# Patient Record
Sex: MALE | Race: OTHER | NOT HISPANIC OR LATINO | URBAN - METROPOLITAN AREA
[De-identification: names, ages, dates, MRNs, and addresses within clinical notes are randomized per-mention and may not be internally consistent; named-entity substitution may affect disease eponyms.]

---

## 2018-04-24 RX ORDER — BETAMETHASONE VALERATE 1 MG/G
0.1 CREAM TOPICAL
COMMUNITY
Start: 2017-02-20 | End: 2019-04-04 | Stop reason: ALTCHOICE

## 2018-04-24 RX ORDER — GABAPENTIN 100 MG/1
1 CAPSULE ORAL DAILY
COMMUNITY
End: 2019-04-04 | Stop reason: ALTCHOICE

## 2018-04-25 ENCOUNTER — OFFICE VISIT (OUTPATIENT)
Dept: INTERNAL MEDICINE | Facility: CLINIC | Age: 63
End: 2018-04-25

## 2018-04-25 VITALS
RESPIRATION RATE: 16 BRPM | HEIGHT: 65 IN | WEIGHT: 177 LBS | OXYGEN SATURATION: 97 % | HEART RATE: 66 BPM | DIASTOLIC BLOOD PRESSURE: 70 MMHG | BODY MASS INDEX: 29.49 KG/M2 | SYSTOLIC BLOOD PRESSURE: 118 MMHG

## 2018-04-25 DIAGNOSIS — Z12.9 CANCER SCREENING: Primary | ICD-10-CM

## 2018-04-25 DIAGNOSIS — Z00.00 PHYSICAL EXAM: ICD-10-CM

## 2018-04-25 PROCEDURE — 99213 OFFICE O/P EST LOW 20 MIN: CPT | Performed by: INTERNAL MEDICINE

## 2018-04-29 PROBLEM — Z00.00 PHYSICAL EXAM: Status: ACTIVE | Noted: 2018-04-29

## 2018-04-29 ASSESSMENT — ENCOUNTER SYMPTOMS
GASTROINTESTINAL NEGATIVE: 1
NEUROLOGICAL NEGATIVE: 1
ALLERGIC/IMMUNOLOGIC NEGATIVE: 1
PSYCHIATRIC NEGATIVE: 1
RESPIRATORY NEGATIVE: 1
MUSCULOSKELETAL NEGATIVE: 1
CONSTITUTIONAL NEGATIVE: 1
ENDOCRINE NEGATIVE: 1
EYES NEGATIVE: 1
CARDIOVASCULAR NEGATIVE: 1

## 2018-04-30 NOTE — ASSESSMENT & PLAN NOTE
No evidence of infection  Physical exam is stable  We will check additional studies at this time in review them as they become available.

## 2018-04-30 NOTE — PROGRESS NOTES
Patient ID: Acacia Alonso is a 62 y.o. male.      Problem List Items Addressed This Visit        Other    Physical exam     No evidence of infection  Physical exam is stable  We will check additional studies at this time in review them as they become available.           Other Visit Diagnoses     Cancer screening    -  Primary    Relevant Orders    X-RAY CHEST 2 VIEWS    CBC and Differential    Comprehensive metabolic panel    Fecal Immunochemical    PSA    Lipid panel    Urinalysis with Reflex Culture          Patient Active Problem List   Diagnosis   • Physical exam       Patient's Medications   New Prescriptions    No medications on file   Previous Medications    BETAMETHASONE VALERATE (VALISONE) 0.1 % CREAM    Apply 0.1 % topically.    GABAPENTIN (NEURONTIN) 100 MG CAPSULE    Take 1 capsule by mouth daily.   Modified Medications    No medications on file   Discontinued Medications    No medications on file     New Prescriptions    No medications on file       No Known Allergies    Social History   Substance Use Topics   • Smoking status: Never Smoker   • Smokeless tobacco: Never Used   • Alcohol use Not on file       Family History   Problem Relation Age of Onset   • Coronary artery disease Father        Subjective   The patient presents the office today for follow-up regarding his medical issues.  1.  Needs visit for work, works as a nurse.  No evidence of any problems or infection that would preclude him from working  2.  Needs health maintenance.  Discussed the idea of health maintenance, and preventing disease.  He was provided with prescriptions.    HPI  I reviewed his  medications, recent labs, and correspondence.     Review of Systems   Constitutional: Negative.    HENT: Negative.    Eyes: Negative.    Respiratory: Negative.    Cardiovascular: Negative.    Gastrointestinal: Negative.    Endocrine: Negative.    Genitourinary: Negative.    Musculoskeletal: Negative.    Allergic/Immunologic: Negative.   "  Neurological: Negative.    Psychiatric/Behavioral: Negative.        /70 (BP Location: Left upper arm, Patient Position: Sitting)   Pulse 66   Resp 16   Ht 1.651 m (5' 5\")   Wt 80.3 kg (177 lb)   SpO2 97%   BMI 29.45 kg/m²       Physical Exam   Constitutional: He is oriented to person, place, and time. He appears well-developed and well-nourished.   HENT:   Head: Normocephalic and atraumatic.   Right Ear: External ear normal.   Left Ear: External ear normal.   Nose: Nose normal.   Mouth/Throat: Oropharynx is clear and moist.   Eyes: Conjunctivae and EOM are normal. Pupils are equal, round, and reactive to light.   Neck: Normal range of motion. No thyromegaly present.   Cardiovascular: Normal rate, regular rhythm, normal heart sounds and intact distal pulses.    Pulmonary/Chest: Effort normal and breath sounds normal.   Abdominal: Soft. Bowel sounds are normal.   Musculoskeletal: Normal range of motion.   Lymphadenopathy:     He has no cervical adenopathy.   Neurological: He is alert and oriented to person, place, and time.   Skin: Skin is warm and dry.   Psychiatric: He has a normal mood and affect.                        "

## 2019-01-03 ENCOUNTER — TELEPHONE (OUTPATIENT)
Dept: INTERNAL MEDICINE | Facility: CLINIC | Age: 64
End: 2019-01-03

## 2019-01-03 DIAGNOSIS — Z02.1 DRUG SCREENING, PRE-EMPLOYMENT: Primary | ICD-10-CM

## 2019-01-03 NOTE — TELEPHONE ENCOUNTER
Pt is looking for lab results from 2010.  Pt is specifically asking for drug screening labs.  Does pt need to fill out a medical release form?  Can he request them himself from HIM?  Pt wants the results faxed once they are obtained to delmis Kimble at [phone].

## 2019-01-10 ENCOUNTER — TELEPHONE (OUTPATIENT)
Dept: INTERNAL MEDICINE | Facility: CLINIC | Age: 64
End: 2019-01-10

## 2019-01-10 NOTE — TELEPHONE ENCOUNTER
Pt is calling again to see if we were able to get his results from [facility] for his drug screening.  Pt needs these results ASAP

## 2019-01-14 ENCOUNTER — TELEPHONE (OUTPATIENT)
Dept: INTERNAL MEDICINE | Facility: CLINIC | Age: 64
End: 2019-01-14

## 2019-04-04 ENCOUNTER — OFFICE VISIT (OUTPATIENT)
Dept: INTERNAL MEDICINE | Facility: CLINIC | Age: 64
End: 2019-04-04

## 2019-04-04 VITALS
HEIGHT: 65 IN | WEIGHT: 175.5 LBS | DIASTOLIC BLOOD PRESSURE: 70 MMHG | TEMPERATURE: 97.7 F | OXYGEN SATURATION: 98 % | BODY MASS INDEX: 29.24 KG/M2 | RESPIRATION RATE: 14 BRPM | SYSTOLIC BLOOD PRESSURE: 110 MMHG | HEART RATE: 79 BPM

## 2019-04-04 DIAGNOSIS — Z13.220 SCREENING CHOLESTEROL LEVEL: ICD-10-CM

## 2019-04-04 DIAGNOSIS — Z00.00 ANNUAL PHYSICAL EXAM: Primary | ICD-10-CM

## 2019-04-04 DIAGNOSIS — E55.9 VITAMIN D DEFICIENCY: ICD-10-CM

## 2019-04-04 DIAGNOSIS — Z12.11 SCREENING FOR COLON CANCER: ICD-10-CM

## 2019-04-04 PROCEDURE — 99213 OFFICE O/P EST LOW 20 MIN: CPT | Performed by: NURSE PRACTITIONER

## 2019-04-04 ASSESSMENT — ENCOUNTER SYMPTOMS
COUGH: 0
PHOTOPHOBIA: 0
PALPITATIONS: 0
HEMATURIA: 0
NAUSEA: 0
BLOOD IN STOOL: 0
FREQUENCY: 0
SINUS PAIN: 0
POLYDIPSIA: 0
SINUS PRESSURE: 0
SHORTNESS OF BREATH: 0
POLYPHAGIA: 0
WEAKNESS: 0
CONSTIPATION: 0
HALLUCINATIONS: 0
FEVER: 0
APPETITE CHANGE: 0
SPEECH DIFFICULTY: 0
NERVOUS/ANXIOUS: 0
EYE DISCHARGE: 0
CHILLS: 0
DIZZINESS: 0
JOINT SWELLING: 0
HEADACHES: 0
DIFFICULTY URINATING: 0
ABDOMINAL PAIN: 0
TROUBLE SWALLOWING: 0
EYE PAIN: 0
FATIGUE: 0
VOMITING: 0
SLEEP DISTURBANCE: 0

## 2019-04-04 NOTE — PATIENT INSTRUCTIONS
Continue healthy diet and stay active  When you have insurance need to check lab. Lab script given

## 2019-04-04 NOTE — PROGRESS NOTES
Subjective      Patient ID: Acacia Alonso is a 63 y.o. male.  1955      Pt here for check up.  Feeling well. No chest pain or shortness of breath. No palpitation or dizziness.  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 Had chest xray done in 2015 and again recently negative        History reviewed. No pertinent past medical history.    History reviewed. No pertinent surgical history.    Family History   Problem Relation Age of Onset   • Coronary artery disease Father        Social History     Social History   • Marital status:      Spouse name: N/A   • Number of children: N/A   • Years of education: N/A     Occupational History   • Not on file.     Social History Main Topics   • Smoking status: Never Smoker   • Smokeless tobacco: Never Used   • Alcohol use No   • Drug use: Unknown   • Sexual activity: Not on file     Other Topics Concern   • Not on file     Social History Narrative   • No narrative on file       Patient's Medications   New Prescriptions    No medications on file   Previous Medications    No medications on file   Modified Medications    No medications on file   Discontinued Medications    BETAMETHASONE VALERATE (VALISONE) 0.1 % CREAM    Apply 0.1 % topically.    GABAPENTIN (NEURONTIN) 100 MG CAPSULE    Take 1 capsule by mouth daily.     New Prescriptions    No medications on file       No Known Allergies       The following have been reviewed and updated as appropriate in this visit:       Review of Systems   Constitutional: Negative for appetite change, chills, fatigue and fever.   HENT: Negative for congestion, ear pain, sinus pain, sinus pressure and trouble swallowing.    Eyes: Negative for photophobia, pain and discharge.   Respiratory: Negative for cough and shortness of breath.    Cardiovascular: Negative for chest pain, palpitations and leg  "swelling.   Gastrointestinal: Negative for abdominal pain, blood in stool, constipation, nausea and vomiting.   Endocrine: Negative for polydipsia, polyphagia and polyuria.   Genitourinary: Negative for difficulty urinating, frequency and hematuria.   Musculoskeletal: Negative for gait problem and joint swelling.   Skin: Negative for rash.   Neurological: Negative for dizziness, speech difficulty, weakness and headaches.   Psychiatric/Behavioral: Negative for hallucinations, sleep disturbance and suicidal ideas. The patient is not nervous/anxious.        Objective     Vitals:    04/04/19 1000   BP: 110/70   BP Location: Right upper arm   Patient Position: Sitting   Pulse: 79   Resp: 14   Temp: 36.5 °C (97.7 °F)   TempSrc: Oral   SpO2: 98%   Weight: 79.6 kg (175 lb 8 oz)   Height: 1.651 m (5' 5\")     Body mass index is 29.2 kg/m².    Physical Exam   Constitutional: He is oriented to person, place, and time. He appears well-developed and well-nourished.   HENT:   Head: Normocephalic and atraumatic.   Nose: Nose normal.   Eyes: Conjunctivae and EOM are normal. Pupils are equal, round, and reactive to light.   Neck: Normal range of motion.   Cardiovascular: Normal rate, regular rhythm and normal heart sounds.    Pulmonary/Chest: Breath sounds normal. No respiratory distress. He has no wheezes. He has no rales.   Abdominal: Soft. Bowel sounds are normal. He exhibits no distension. There is no tenderness. There is no guarding.   Musculoskeletal: Normal range of motion. He exhibits no edema, tenderness or deformity.   Lymphadenopathy:     He has no cervical adenopathy.   Neurological: He is alert and oriented to person, place, and time. No cranial nerve deficit or sensory deficit. Coordination normal.   Skin: Skin is warm and dry. No rash noted.   Psychiatric: He has a normal mood and affect. His behavior is normal. Judgment and thought content normal.   Vitals reviewed.      Assessment/Plan   Diagnoses and all orders for " this visit:    Annual physical exam (Primary)  Assessment & Plan:  Age appropriate exam  Will check lab  Continue healthy diet and staying active    Orders:  -     CBC and Differential; Future  -     Comprehensive metabolic panel; Future  -     PSA; Future  -     TSH w reflex FT4; Future  -     Urinalysis with Reflex Culture; Future    Screening cholesterol level  -     Lipid panel; Future    Vitamin D deficiency  -     Vitamin D 25 hydroxy; Future    Screening for colon cancer  -     FIT; Future

## 2023-06-21 ENCOUNTER — CONSULTATION (OUTPATIENT)
Dept: URBAN - METROPOLITAN AREA CLINIC 79 | Facility: CLINIC | Age: 68
End: 2023-06-21

## 2023-06-21 DIAGNOSIS — H40.1432: ICD-10-CM

## 2023-06-21 DIAGNOSIS — H17.9: ICD-10-CM

## 2023-06-21 PROCEDURE — 99204 OFFICE O/P NEW MOD 45 MIN: CPT

## 2023-06-21 PROCEDURE — 92250 FUNDUS PHOTOGRAPHY W/I&R: CPT

## 2023-06-21 PROCEDURE — 92025 CPTRIZED CORNEAL TOPOGRAPHY: CPT

## 2023-06-21 PROCEDURE — 92020 GONIOSCOPY: CPT

## 2023-06-21 PROCEDURE — 76514 ECHO EXAM OF EYE THICKNESS: CPT

## 2023-06-21 ASSESSMENT — VISUAL ACUITY
OD_CC: 20/300
OS_CC: 20/25

## 2023-06-21 ASSESSMENT — TONOMETRY
OS_IOP_MMHG: 23
OD_IOP_MMHG: 43
OD_IOP_MMHG: 35
OS_IOP_MMHG: 23

## 2023-06-21 ASSESSMENT — PACHYMETRY
OS_CT_UM: 560
OD_CT_UM: 560

## 2023-06-27 ENCOUNTER — CONSULT EP (OUTPATIENT)
Dept: URBAN - METROPOLITAN AREA CLINIC 79 | Facility: CLINIC | Age: 68
End: 2023-06-27

## 2023-06-27 DIAGNOSIS — H52.211: ICD-10-CM

## 2023-06-27 PROCEDURE — 92025 CPTRIZED CORNEAL TOPOGRAPHY: CPT

## 2023-06-27 PROCEDURE — 99212 OFFICE O/P EST SF 10 MIN: CPT

## 2023-06-27 ASSESSMENT — VISUAL ACUITY
OS_CC: 20/30+2
OD_CC: 20/300
OD_PH: 20/200

## 2023-06-27 ASSESSMENT — TONOMETRY
OD_IOP_MMHG: 8
OS_IOP_MMHG: 12

## 2023-06-29 ENCOUNTER — IOP CHECK (OUTPATIENT)
Dept: URBAN - METROPOLITAN AREA CLINIC 79 | Facility: CLINIC | Age: 68
End: 2023-06-29

## 2023-06-29 DIAGNOSIS — H40.1432: ICD-10-CM

## 2023-06-29 PROCEDURE — 92083 EXTENDED VISUAL FIELD XM: CPT

## 2023-06-29 PROCEDURE — 99212 OFFICE O/P EST SF 10 MIN: CPT

## 2023-06-29 PROCEDURE — 92133 CPTRZD OPH DX IMG PST SGM ON: CPT

## 2023-06-29 ASSESSMENT — TONOMETRY
OS_IOP_MMHG: 16
OD_IOP_MMHG: 10
OS_IOP_MMHG: 12

## 2023-06-29 ASSESSMENT — VISUAL ACUITY
OD_CC: 20/200
OS_CC: 20/25+3

## 2023-07-14 ENCOUNTER — FOLLOW UP (OUTPATIENT)
Dept: URBAN - METROPOLITAN AREA CLINIC 79 | Facility: CLINIC | Age: 68
End: 2023-07-14

## 2023-07-14 DIAGNOSIS — H18.601: ICD-10-CM

## 2023-07-14 DIAGNOSIS — H52.211: ICD-10-CM

## 2023-07-14 PROCEDURE — 92025 CPTRIZED CORNEAL TOPOGRAPHY: CPT

## 2023-07-14 PROCEDURE — 99213 OFFICE O/P EST LOW 20 MIN: CPT

## 2023-07-14 ASSESSMENT — VISUAL ACUITY
OD_CC: 20/200
OS_CC: 20/25+2

## 2023-07-14 ASSESSMENT — TONOMETRY
OD_IOP_MMHG: 8
OS_IOP_MMHG: 19

## 2023-09-06 ENCOUNTER — IOP CHECK (OUTPATIENT)
Dept: URBAN - METROPOLITAN AREA CLINIC 79 | Facility: CLINIC | Age: 68
End: 2023-09-06

## 2023-09-06 DIAGNOSIS — H25.13: ICD-10-CM

## 2023-09-06 DIAGNOSIS — H40.1432: ICD-10-CM

## 2023-09-06 PROCEDURE — 99213 OFFICE O/P EST LOW 20 MIN: CPT

## 2023-09-06 ASSESSMENT — VISUAL ACUITY
OD_CC: 20/300
OD_PH: 20/125
OS_CC: 20/20

## 2023-09-06 ASSESSMENT — TONOMETRY
OD_IOP_MMHG: 8
OS_IOP_MMHG: 18

## 2023-12-13 ENCOUNTER — IOP CHECK (OUTPATIENT)
Dept: URBAN - METROPOLITAN AREA CLINIC 79 | Facility: CLINIC | Age: 68
End: 2023-12-13

## 2023-12-13 DIAGNOSIS — H25.13: ICD-10-CM

## 2023-12-13 DIAGNOSIS — H40.1432: ICD-10-CM

## 2023-12-13 PROCEDURE — 99213 OFFICE O/P EST LOW 20 MIN: CPT

## 2023-12-13 ASSESSMENT — TONOMETRY
OD_IOP_MMHG: 18
OS_IOP_MMHG: 22

## 2023-12-13 ASSESSMENT — VISUAL ACUITY
OD_CC: 20/400
OS_CC: 20/25
OD_PH: 20/200

## 2024-09-04 ENCOUNTER — IOP CHECK (OUTPATIENT)
Dept: URBAN - METROPOLITAN AREA CLINIC 79 | Facility: CLINIC | Age: 69
End: 2024-09-04

## 2024-09-04 DIAGNOSIS — H25.13: ICD-10-CM

## 2024-09-04 DIAGNOSIS — H40.1432: ICD-10-CM

## 2024-09-04 PROCEDURE — 92250 FUNDUS PHOTOGRAPHY W/I&R: CPT

## 2024-09-04 PROCEDURE — 92083 EXTENDED VISUAL FIELD XM: CPT

## 2024-09-04 PROCEDURE — 92012 INTRM OPH EXAM EST PATIENT: CPT

## 2024-09-04 ASSESSMENT — VISUAL ACUITY
OS_CC: 20/25-2
OD_PH: 20/250
OD_CC: 20/300

## 2024-09-04 ASSESSMENT — TONOMETRY
OS_IOP_MMHG: 11
OD_IOP_MMHG: 10

## 2024-12-12 ENCOUNTER — ESTABLISHED COMPREHENSIVE EXAM (OUTPATIENT)
Dept: URBAN - METROPOLITAN AREA CLINIC 79 | Facility: CLINIC | Age: 69
End: 2024-12-12

## 2024-12-12 DIAGNOSIS — H17.9: ICD-10-CM

## 2024-12-12 DIAGNOSIS — H25.13: ICD-10-CM

## 2024-12-12 DIAGNOSIS — H40.1432: ICD-10-CM

## 2024-12-12 PROCEDURE — 92014 COMPRE OPH EXAM EST PT 1/>: CPT

## 2024-12-12 PROCEDURE — 92133 CPTRZD OPH DX IMG PST SGM ON: CPT

## 2024-12-12 ASSESSMENT — VISUAL ACUITY
OS_CC: 20/25
OS_SC: 20/125
OD_SC: 20/250
OD_CC: 20/50
OS_CC: 20/20-2
OS_GLARE: 20/100
OD_CC: 20/250

## 2024-12-12 ASSESSMENT — TONOMETRY
OS_IOP_MMHG: 15
OD_IOP_MMHG: 15

## 2025-07-17 ENCOUNTER — IOP CHECK (OUTPATIENT)
Dept: URBAN - METROPOLITAN AREA CLINIC 79 | Facility: CLINIC | Age: 70
End: 2025-07-17

## 2025-07-17 DIAGNOSIS — H25.13: ICD-10-CM

## 2025-07-17 DIAGNOSIS — H40.1432: ICD-10-CM

## 2025-07-17 PROCEDURE — 92012 INTRM OPH EXAM EST PATIENT: CPT

## 2025-07-17 PROCEDURE — 92133 CPTRZD OPH DX IMG PST SGM ON: CPT

## 2025-07-17 ASSESSMENT — VISUAL ACUITY
OD_PH: 20/200
OS_CC: 20/30-2
OD_CC: 20/300
OD_CC: 20/40
OS_CC: 20/40

## 2025-07-17 ASSESSMENT — TONOMETRY
OD_IOP_MMHG: 12
OS_IOP_MMHG: 19

## (undated) RX ORDER — DORZOLAMIDE HYDROCHLORIDE AND TIMOLOL MALEATE 20; 5 MG/ML; MG/ML: 1 SOLUTION/ DROPS OPHTHALMIC TWICE A DAY